# Patient Record
Sex: MALE | Race: ASIAN | NOT HISPANIC OR LATINO | URBAN - METROPOLITAN AREA
[De-identification: names, ages, dates, MRNs, and addresses within clinical notes are randomized per-mention and may not be internally consistent; named-entity substitution may affect disease eponyms.]

---
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
## 2022-10-08 PROBLEM — Z78.9 OTHER SPECIFIED HEALTH STATUS: Chronic | Status: ACTIVE | Noted: 2021-06-26

## 2022-10-21 PROBLEM — Z00.00 ENCOUNTER FOR PREVENTIVE HEALTH EXAMINATION: Status: ACTIVE | Noted: 2022-10-21

## 2022-10-24 ENCOUNTER — NON-APPOINTMENT (OUTPATIENT)
Age: 22
End: 2022-10-24

## 2022-10-24 ENCOUNTER — APPOINTMENT (OUTPATIENT)
Dept: ORTHOPEDIC SURGERY | Facility: CLINIC | Age: 22
End: 2022-10-24

## 2022-10-24 VITALS
HEIGHT: 71 IN | WEIGHT: 173 LBS | BODY MASS INDEX: 24.22 KG/M2 | DIASTOLIC BLOOD PRESSURE: 80 MMHG | SYSTOLIC BLOOD PRESSURE: 120 MMHG | HEART RATE: 80 BPM

## 2022-10-24 PROCEDURE — 99204 OFFICE O/P NEW MOD 45 MIN: CPT

## 2022-10-24 NOTE — PHYSICAL EXAM
[de-identified] : Left ankle Physical Examination:\par \par General: Alert and oriented x3.  In no acute distress.  Pleasant in nature with a normal affect.  No apparent respiratory distress. \par Erythema, Warmth, Rubor: Negative\par Swelling: Negative\par \par ROM:\par 1. Dorsiflexion: 10 degrees\par 2. Plantarflexion: 40 degrees\par 3. Inversion: 20 degrees\par 4. Eversion: 20 degrees\par \par Tenderness to Palpation: \par 1. Lateral Malleolus: Negative\par 2. Medial Malleolus: Negative\par 3. Proximal Fibular Pain: Negative\par 4. Heel Pain: Negative\par 5. Cuboid: Negative\par 6. Navicular: Negative\par 7. Tibiotalar Joint: Negative\par 8. Subtalar Joint: Negative\par 9. Posterior Recess: Negative\par \par Tendon Pain:\par 1. Achilles: Negative\par 2. Peroneals: Positive\par 3. Posterior Tibialis: Positive\par 4. Tibialis Anterior: Negative\par \par Ligament Pain:\par 1. ATFL: Positive\par 2. CFL: Negative \par 3. PTFL: Negative\par 4. Deltoid Ligaments: Negative\par 5. Lis Franc Ligament: Negative\par \par Stability: \par 1. Anterior Drawer: Negative\par 2. Posterior Drawer: Negative\par \par Strength: 5/5 TA/GS/EHL\par \par Pulses: 2+ DP/PT Pulses\par \par Neuro: Intact motor and sensory\par \par Additional Test:\par 1. Calcaneal Squeeze Test: Negative\par 2. Syndesmosis Squeeze Test: Negative [de-identified] : Exam requested by:\par CLOTILDE BARRON DPM\par [address]\par [facility] [address]\par SITE PERFORMED: [facility]\par Patient: ANUSHA GRAFF\par YOB: 2000\par Phone: [phone] \par MRN: [id number] Acc: [id number]\par Date of Exam: 09-\par  \par EXAM:  MRI LEFT ANKLE WITHOUT CONTRAST\par \par HISTORY:  Left ankle pain with concern for ligament tear\par \par TECHNIQUE:  Multiplanar imaging with and without fat suppression \par \par \par COMPARISON:  No comparison study\par \par FINDINGS:  \par \par No fracture tibia. No fracture fibula. No osteochondral defect talar dome.\par \par No evidence for coalition. Calcaneocuboid joint intact. Talonavicular joint intact with dorsal spurring at the talar insertion of the capsule.\par \par Plantar fascia intact at the origin. No bursitis. No fracture.\par \par Achilles intact at the insertion. Physiologic fluid in the bursa. 5 mm bone island base of superior calcaneal process\par \par Extensor tendons are intact. No tenosynovitis.\par \par Peroneal tendons are intact. Minor tendinopathy peroneus longus distal to the peroneal tubercle. No tenosynovitis or subluxation. Broad peroneal tubercle. Minimal reactive marrow edema within the tubercle and at the base of the tubercle.\par \par Posterior tibial tendinopathy with insertional fraying. No tenosynovitis. Peritendinous edema. Remaining flexor tendons intact. No lesion tarsal tunnel. No muscle atrophy or tear..\par \par Syndesmosis intact. Tibiofibular ligaments intact. Chronic tear anterior talofibular ligament and calcaneofibular ligament. Deltoid ligaments intact. Spring ligament intact. Tarsal sinus ligaments are scarred with scarring of the fat. No lesion tarsal sinus.\par \par IMPRESSION:  \par \par 1. Posterior tibial tendinopathy with insertional fraying and peritendinous edema.\par 2. Broad peroneal tubercle with minimal reactive marrow edema compatible with stress reaction. Peroneus longus tendinopathy just distal to the peroneal tubercle with no peroneal tendon tear.\par 3. Chronic tear of the lateral ligaments\par \par Thank you for the opportunity to participate in the care of this patient.  \par  \par Steven Ocampo MD  - Electronically Signed: 10- 7:44 AM \par Physician to Physician Direct Line is: [phone]\par Confidential\par Tel: [phone] - Fax: [phone] - [email or web address]\par Printed: 10- 1:17 PM\par ANUSHA GRAFF (Exam: 09- 2:10 PM)\par Page 1 of 1\par

## 2022-10-24 NOTE — HISTORY OF PRESENT ILLNESS
[FreeTextEntry1] : The patient is a 22-year-old male who presents with a left ankle injury which she sustained at the end of April while playing basketball.  He came down on someone's foot and rolled the left ankle.  He does have an MRI of the left ankle to be reviewed in office today.  He has not done any type of outpatient therapy for his left ankle.  He denies locking and catching of the ankle.  He does have a brace given to him by a podiatrist which she does use.  No other complaints.  Pain scale minimal today in the office. No other complaints.

## 2022-10-24 NOTE — DISCUSSION/SUMMARY
[de-identified] : Assessment: Left ankle injury\par \par Plan:\par 1. Physical Therapy.\par 2. NSAIDs/Tylenol as needed for pain. \par 3. Return to normal activities as tolerated. \par 4. Continue with ice and heat therapy. \par 5. All questions answered.  Follow-up as needed.  The patient understood the treatment plan.

## 2022-10-24 NOTE — ADDENDUM
[FreeTextEntry1] : I, Beverly Aguilar, acted as a scribe for Dr. Popeye Smallwood on this date 10/24/2022.\par \par All medical record entries made by the Scribe were at my, Dr. Popeye Smallwood, direction and personally dictated by me on 10/24/2022. I have reviewed the chart and agree that the record accurately reflects my personal performance of the history, physical exam, assessment and plan. I have also personally directed, reviewed, and agreed with the chart.	\par

## 2023-01-18 ENCOUNTER — APPOINTMENT (OUTPATIENT)
Dept: ORTHOPEDIC SURGERY | Facility: CLINIC | Age: 23
End: 2023-01-18
Payer: MEDICAID

## 2023-01-18 PROCEDURE — 99214 OFFICE O/P EST MOD 30 MIN: CPT

## 2023-01-18 NOTE — DISCUSSION/SUMMARY
[de-identified] : The patient will continue with conservative management and observation for his left ankle.  He will continue with home exercises and stretches learned at physical therapy and a new physical therapy prescription was provided..  He can slowly get back to normal activities and sports as tolerated.  I explained to him that proper shoe wear with proper inserts is extremely important.  He can use an over-the-counter ankle brace versus sleeve for support when playing sports.  Anti-inflammatory gel prescribed.  All of his questions were answered.  He can follow-up on an as-needed basis.

## 2023-01-18 NOTE — HISTORY OF PRESENT ILLNESS
[FreeTextEntry1] : 1/18/23: The patient is here for follow-up of his left ankle injury.  He has been performing physical therapy which has helped him.  He just finished physical therapy.  His pain is much improved left ankle.  He does have pain he states is mostly on the outside portion of his ankle.  No other complaints.\par \par 10/24/22: The patient is a 22-year-old male who presents with a left ankle injury which she sustained at the end of April while playing basketball.  He came down on someone's foot and rolled the left ankle.  He does have an MRI of the left ankle to be reviewed in office today.  He has not done any type of outpatient therapy for his left ankle.  He denies locking and catching of the ankle.  He does have a brace given to him by a podiatrist which she does use.  No other complaints.  Pain scale minimal today in the office. No other complaints.

## 2023-01-18 NOTE — PHYSICAL EXAM
[de-identified] : Left ankle Physical Examination:\par \par General: Alert and oriented x3.  In no acute distress.  Pleasant in nature with a normal affect.  No apparent respiratory distress. \par Erythema, Warmth, Rubor: Negative\par Swelling: Negative\par \par ROM:\par 1. Dorsiflexion: 10 degrees\par 2. Plantarflexion: 40 degrees\par 3. Inversion: 30 degrees\par 4. Eversion: 30 degrees\par \par Tenderness to Palpation: \par 1. Lateral Malleolus: Negative\par 2. Medial Malleolus: Negative\par 3. Proximal Fibular Pain: Negative\par 4. Heel Pain: Negative\par 5. Cuboid: Negative\par 6. Navicular: Negative\par 7. Tibiotalar Joint: Negative\par 8. Subtalar Joint: Negative\par 9. Posterior Recess: Negative\par \par Tendon Pain:\par 1. Achilles: Negative\par 2. Peroneals: Positive, improved.\par 3. Posterior Tibialis: Negative\par 4. Tibialis Anterior: Negative\par \par Ligament Pain:\par 1. ATFL: Positive, improved.\par 2. CFL: Negative \par 3. PTFL: Negative\par 4. Deltoid Ligaments: Negative\par 5. Lis Franc Ligament: Negative\par \par Stability: \par 1. Anterior Drawer: Negative\par 2. Posterior Drawer: Negative\par \par Strength: 5/5 TA/GS/EHL\par \par Pulses: 2+ DP/PT Pulses\par \par Neuro: Intact motor and sensory\par \par Additional Test:\par 1. Calcaneal Squeeze Test: Negative\par 2. Syndesmosis Squeeze Test: Negative [de-identified] : Exam requested by:\par CLOTILDE BARRON DPM\par [address]\par [facility] [address]\par SITE PERFORMED: [facility]\par Patient: ANUSHA GRAFF\par YOB: 2000\par Phone: [phone] \par MRN: [id number] Acc: [id number]\par Date of Exam: 09-\par  \par EXAM:  MRI LEFT ANKLE WITHOUT CONTRAST\par \par HISTORY:  Left ankle pain with concern for ligament tear\par \par TECHNIQUE:  Multiplanar imaging with and without fat suppression \par \par \par COMPARISON:  No comparison study\par \par FINDINGS:  \par \par No fracture tibia. No fracture fibula. No osteochondral defect talar dome.\par \par No evidence for coalition. Calcaneocuboid joint intact. Talonavicular joint intact with dorsal spurring at the talar insertion of the capsule.\par \par Plantar fascia intact at the origin. No bursitis. No fracture.\par \par Achilles intact at the insertion. Physiologic fluid in the bursa. 5 mm bone island base of superior calcaneal process\par \par Extensor tendons are intact. No tenosynovitis.\par \par Peroneal tendons are intact. Minor tendinopathy peroneus longus distal to the peroneal tubercle. No tenosynovitis or subluxation. Broad peroneal tubercle. Minimal reactive marrow edema within the tubercle and at the base of the tubercle.\par \par Posterior tibial tendinopathy with insertional fraying. No tenosynovitis. Peritendinous edema. Remaining flexor tendons intact. No lesion tarsal tunnel. No muscle atrophy or tear..\par \par Syndesmosis intact. Tibiofibular ligaments intact. Chronic tear anterior talofibular ligament and calcaneofibular ligament. Deltoid ligaments intact. Spring ligament intact. Tarsal sinus ligaments are scarred with scarring of the fat. No lesion tarsal sinus.\par \par IMPRESSION:  \par \par 1. Posterior tibial tendinopathy with insertional fraying and peritendinous edema.\par 2. Broad peroneal tubercle with minimal reactive marrow edema compatible with stress reaction. Peroneus longus tendinopathy just distal to the peroneal tubercle with no peroneal tendon tear.\par 3. Chronic tear of the lateral ligaments\par \par Thank you for the opportunity to participate in the care of this patient.  \par  \par Steven Ocampo MD  - Electronically Signed: 10- 7:44 AM \par Physician to Physician Direct Line is: [phone]\par Confidential\par Tel: [phone] - Fax: [phone] - [email or web address]\par Printed: 10- 1:17 PM\par ANUSHA GRAFF (Exam: 09- 2:10 PM)\par Page 1 of 1\par

## 2023-02-22 ENCOUNTER — RX RENEWAL (OUTPATIENT)
Age: 23
End: 2023-02-22

## 2023-02-22 RX ORDER — MELOXICAM 15 MG/1
15 TABLET ORAL
Qty: 14 | Refills: 0 | Status: ACTIVE | COMMUNITY
Start: 2023-01-18 | End: 1900-01-01

## 2023-04-12 ENCOUNTER — APPOINTMENT (OUTPATIENT)
Dept: ORTHOPEDIC SURGERY | Facility: CLINIC | Age: 23
End: 2023-04-12
Payer: MEDICAID

## 2023-04-12 PROCEDURE — 99213 OFFICE O/P EST LOW 20 MIN: CPT

## 2023-04-12 NOTE — PHYSICAL EXAM
[de-identified] : Left ankle Physical Examination:\par \par General: Alert and oriented x3.  In no acute distress.  Pleasant in nature with a normal affect.  No apparent respiratory distress. \par Erythema, Warmth, Rubor: Negative\par Swelling: Negative\par \par ROM:\par 1. Dorsiflexion: 10 degrees\par 2. Plantarflexion: 40 degrees\par 3. Inversion: 30 degrees\par 4. Eversion: 30 degrees\par \par Tenderness to Palpation: \par 1. Lateral Malleolus: Negative\par 2. Medial Malleolus: Negative\par 3. Proximal Fibular Pain: Negative\par 4. Heel Pain: Negative\par 5. Cuboid: Negative\par 6. Navicular: Negative\par 7. Tibiotalar Joint: Negative\par 8. Subtalar Joint: Negative\par 9. Posterior Recess: Negative\par \par Tendon Pain:\par 1. Achilles: Negative\par 2. Peroneals: Positive, improved.\par 3. Posterior Tibialis: Negative\par 4. Tibialis Anterior: Negative\par \par Ligament Pain:\par 1. ATFL: Positive, improved.\par 2. CFL: Negative \par 3. PTFL: Negative\par 4. Deltoid Ligaments: Negative\par 5. Lis Franc Ligament: Negative\par \par Stability: \par 1. Anterior Drawer: Negative\par 2. Posterior Drawer: Negative\par \par Strength: 5/5 TA/GS/EHL\par \par Pulses: 2+ DP/PT Pulses\par \par Neuro: Intact motor and sensory\par \par Additional Test:\par 1. Calcaneal Squeeze Test: Negative\par 2. Syndesmosis Squeeze Test: Negative [de-identified] : Exam requested by:\par CLOTILDE BARRON DPM\par [address]\par [facility] [address]\par SITE PERFORMED: [facility]\par Patient: ANUSHA GRAFF\par YOB: 2000\par Phone: [phone] \par MRN: [id number] Acc: [id number]\par Date of Exam: 09-\par  \par EXAM:  MRI LEFT ANKLE WITHOUT CONTRAST\par \par HISTORY:  Left ankle pain with concern for ligament tear\par \par TECHNIQUE:  Multiplanar imaging with and without fat suppression \par \par \par COMPARISON:  No comparison study\par \par FINDINGS:  \par \par No fracture tibia. No fracture fibula. No osteochondral defect talar dome.\par \par No evidence for coalition. Calcaneocuboid joint intact. Talonavicular joint intact with dorsal spurring at the talar insertion of the capsule.\par \par Plantar fascia intact at the origin. No bursitis. No fracture.\par \par Achilles intact at the insertion. Physiologic fluid in the bursa. 5 mm bone island base of superior calcaneal process\par \par Extensor tendons are intact. No tenosynovitis.\par \par Peroneal tendons are intact. Minor tendinopathy peroneus longus distal to the peroneal tubercle. No tenosynovitis or subluxation. Broad peroneal tubercle. Minimal reactive marrow edema within the tubercle and at the base of the tubercle.\par \par Posterior tibial tendinopathy with insertional fraying. No tenosynovitis. Peritendinous edema. Remaining flexor tendons intact. No lesion tarsal tunnel. No muscle atrophy or tear..\par \par Syndesmosis intact. Tibiofibular ligaments intact. Chronic tear anterior talofibular ligament and calcaneofibular ligament. Deltoid ligaments intact. Spring ligament intact. Tarsal sinus ligaments are scarred with scarring of the fat. No lesion tarsal sinus.\par \par IMPRESSION:  \par \par 1. Posterior tibial tendinopathy with insertional fraying and peritendinous edema.\par 2. Broad peroneal tubercle with minimal reactive marrow edema compatible with stress reaction. Peroneus longus tendinopathy just distal to the peroneal tubercle with no peroneal tendon tear.\par 3. Chronic tear of the lateral ligaments\par \par Thank you for the opportunity to participate in the care of this patient.  \par  \par Steven Ocampo MD  - Electronically Signed: 10- 7:44 AM \par Physician to Physician Direct Line is: [phone]\par Confidential\par Tel: [phone] - Fax: [phone] - [email or web address]\par Printed: 10- 1:17 PM\par ANUSHA GRAFF (Exam: 09- 2:10 PM)\par Page 1 of 1\par

## 2023-04-12 NOTE — ADDENDUM
[FreeTextEntry1] : I, COLLINS RAINERAPOLONIAKARINE, acted solely as a scribe for Dr. Popeye Smallwood on this date 04/12/2023  .\par  \par All medical record entries made by the Scribe were at my, Dr. Popeye Smallwood, direction and personally dictated by me on 04/12/2023 . I have reviewed the chart and agree that the record accurately reflects my personal performance of the history, physical exam, assessment and plan. I have also personally directed, reviewed, and agreed with the chart.

## 2023-04-12 NOTE — DISCUSSION/SUMMARY
[de-identified] : Today I had a lengthy discussion with the patient regarding their left ankle pain.I have addressed all the patient's concerns surrounding the pathology of their condition. We discussed operative and non operative treatment, At this time I would like to obtain advanced imaging of the patient's left ankle. An MRI was ordered so I can find out more about the etiology of the patient's condition. The patient should follow up with the office after obtaining the MRI. \par The patient will continue with conservative management and observation for his left ankle.  He will continue with home exercises and stretches learned at physical therapy and a new physical therapy prescription was provided. All of his questions were answered.\par \par The patient understood and verbally agreed to the treatment plan. All of their questions were answered and they were satisfied with the visit. The patient should call the office if they have any questions or experience worsening symptoms. \par

## 2023-04-12 NOTE — HISTORY OF PRESENT ILLNESS
[FreeTextEntry1] : 4/12/23: The patient is here for follow-up of his left ankle. He finished physical therapy and had relief, but he is still having some intermittent pain. He would like an MRI to see how his ankle is progressing and what next steps he can take. \par \par 1/18/23: The patient is here for follow-up of his left ankle injury.  He has been performing physical therapy which has helped him.  He just finished physical therapy.  His pain is much improved left ankle.  He does have pain he states is mostly on the outside portion of his ankle.  No other complaints.\par \par 10/24/22: The patient is a 22-year-old male who presents with a left ankle injury which she sustained at the end of April while playing basketball.  He came down on someone's foot and rolled the left ankle.  He does have an MRI of the left ankle to be reviewed in office today.  He has not done any type of outpatient therapy for his left ankle.  He denies locking and catching of the ankle.  He does have a brace given to him by a podiatrist which she does use.  No other complaints.  Pain scale minimal today in the office. No other complaints.

## 2023-04-12 NOTE — DISCUSSION/SUMMARY
[de-identified] : Today I had a lengthy discussion with the patient regarding their left ankle pain.I have addressed all the patient's concerns surrounding the pathology of their condition. We discussed operative and non operative treatment, At this time I would like to obtain advanced imaging of the patient's left ankle. An MRI was ordered so I can find out more about the etiology of the patient's condition. The patient should follow up with the office after obtaining the MRI. \par The patient will continue with conservative management and observation for his left ankle.  He will continue with home exercises and stretches learned at physical therapy and a new physical therapy prescription was provided. All of his questions were answered.\par \par The patient understood and verbally agreed to the treatment plan. All of their questions were answered and they were satisfied with the visit. The patient should call the office if they have any questions or experience worsening symptoms. \par

## 2023-04-12 NOTE — PHYSICAL EXAM
[de-identified] : Left ankle Physical Examination:\par \par General: Alert and oriented x3.  In no acute distress.  Pleasant in nature with a normal affect.  No apparent respiratory distress. \par Erythema, Warmth, Rubor: Negative\par Swelling: Negative\par \par ROM:\par 1. Dorsiflexion: 10 degrees\par 2. Plantarflexion: 40 degrees\par 3. Inversion: 30 degrees\par 4. Eversion: 30 degrees\par \par Tenderness to Palpation: \par 1. Lateral Malleolus: Negative\par 2. Medial Malleolus: Negative\par 3. Proximal Fibular Pain: Negative\par 4. Heel Pain: Negative\par 5. Cuboid: Negative\par 6. Navicular: Negative\par 7. Tibiotalar Joint: Negative\par 8. Subtalar Joint: Negative\par 9. Posterior Recess: Negative\par \par Tendon Pain:\par 1. Achilles: Negative\par 2. Peroneals: Positive, improved.\par 3. Posterior Tibialis: Negative\par 4. Tibialis Anterior: Negative\par \par Ligament Pain:\par 1. ATFL: Positive, improved.\par 2. CFL: Negative \par 3. PTFL: Negative\par 4. Deltoid Ligaments: Negative\par 5. Lis Franc Ligament: Negative\par \par Stability: \par 1. Anterior Drawer: Negative\par 2. Posterior Drawer: Negative\par \par Strength: 5/5 TA/GS/EHL\par \par Pulses: 2+ DP/PT Pulses\par \par Neuro: Intact motor and sensory\par \par Additional Test:\par 1. Calcaneal Squeeze Test: Negative\par 2. Syndesmosis Squeeze Test: Negative [de-identified] : Exam requested by:\par CLOTILDE BARRON DPM\par [address]\par [facility] [address]\par SITE PERFORMED: [facility]\par Patient: ANUSHA GRAFF\par YOB: 2000\par Phone: [phone] \par MRN: [id number] Acc: [id number]\par Date of Exam: 09-\par  \par EXAM:  MRI LEFT ANKLE WITHOUT CONTRAST\par \par HISTORY:  Left ankle pain with concern for ligament tear\par \par TECHNIQUE:  Multiplanar imaging with and without fat suppression \par \par \par COMPARISON:  No comparison study\par \par FINDINGS:  \par \par No fracture tibia. No fracture fibula. No osteochondral defect talar dome.\par \par No evidence for coalition. Calcaneocuboid joint intact. Talonavicular joint intact with dorsal spurring at the talar insertion of the capsule.\par \par Plantar fascia intact at the origin. No bursitis. No fracture.\par \par Achilles intact at the insertion. Physiologic fluid in the bursa. 5 mm bone island base of superior calcaneal process\par \par Extensor tendons are intact. No tenosynovitis.\par \par Peroneal tendons are intact. Minor tendinopathy peroneus longus distal to the peroneal tubercle. No tenosynovitis or subluxation. Broad peroneal tubercle. Minimal reactive marrow edema within the tubercle and at the base of the tubercle.\par \par Posterior tibial tendinopathy with insertional fraying. No tenosynovitis. Peritendinous edema. Remaining flexor tendons intact. No lesion tarsal tunnel. No muscle atrophy or tear..\par \par Syndesmosis intact. Tibiofibular ligaments intact. Chronic tear anterior talofibular ligament and calcaneofibular ligament. Deltoid ligaments intact. Spring ligament intact. Tarsal sinus ligaments are scarred with scarring of the fat. No lesion tarsal sinus.\par \par IMPRESSION:  \par \par 1. Posterior tibial tendinopathy with insertional fraying and peritendinous edema.\par 2. Broad peroneal tubercle with minimal reactive marrow edema compatible with stress reaction. Peroneus longus tendinopathy just distal to the peroneal tubercle with no peroneal tendon tear.\par 3. Chronic tear of the lateral ligaments\par \par Thank you for the opportunity to participate in the care of this patient.  \par  \par Steven Ocampo MD  - Electronically Signed: 10- 7:44 AM \par Physician to Physician Direct Line is: [phone]\par Confidential\par Tel: [phone] - Fax: [phone] - [email or web address]\par Printed: 10- 1:17 PM\par ANUSHA GRAFF (Exam: 09- 2:10 PM)\par Page 1 of 1\par

## 2023-04-21 ENCOUNTER — TRANSCRIPTION ENCOUNTER (OUTPATIENT)
Age: 23
End: 2023-04-21

## 2023-04-21 ENCOUNTER — APPOINTMENT (OUTPATIENT)
Dept: MRI IMAGING | Facility: CLINIC | Age: 23
End: 2023-04-21
Payer: MEDICAID

## 2023-04-21 ENCOUNTER — OUTPATIENT (OUTPATIENT)
Dept: OUTPATIENT SERVICES | Facility: HOSPITAL | Age: 23
LOS: 1 days | End: 2023-04-21
Payer: MEDICAID

## 2023-04-21 DIAGNOSIS — M67.88 OTHER SPECIFIED DISORDERS OF SYNOVIUM AND TENDON, OTHER SITE: ICD-10-CM

## 2023-04-21 DIAGNOSIS — S93.402A SPRAIN OF UNSPECIFIED LIGAMENT OF LEFT ANKLE, INITIAL ENCOUNTER: ICD-10-CM

## 2023-04-21 DIAGNOSIS — M76.822 POSTERIOR TIBIAL TENDINITIS, LEFT LEG: ICD-10-CM

## 2023-04-21 PROCEDURE — 73721 MRI JNT OF LWR EXTRE W/O DYE: CPT

## 2023-04-21 PROCEDURE — 73721 MRI JNT OF LWR EXTRE W/O DYE: CPT | Mod: 26,LT

## 2023-05-15 ENCOUNTER — APPOINTMENT (OUTPATIENT)
Dept: ORTHOPEDIC SURGERY | Facility: CLINIC | Age: 23
End: 2023-05-15
Payer: MEDICAID

## 2023-05-15 PROCEDURE — 99214 OFFICE O/P EST MOD 30 MIN: CPT

## 2023-05-15 NOTE — PHYSICAL EXAM
[de-identified] : Left ankle Physical Examination:\par \par General: Alert and oriented x3.  In no acute distress.  Pleasant in nature with a normal affect.  No apparent respiratory distress. \par Erythema, Warmth, Rubor: Negative\par Swelling: Negative\par \par ROM:\par 1. Dorsiflexion: 10 degrees\par 2. Plantarflexion: 40 degrees\par 3. Inversion: 30 degrees\par 4. Eversion: 30 degrees\par \par Tenderness to Palpation: \par 1. Lateral Malleolus: Negative\par 2. Medial Malleolus: Negative\par 3. Proximal Fibular Pain: Negative\par 4. Heel Pain: Negative\par 5. Cuboid: Negative\par 6. Navicular: Negative\par 7. Tibiotalar Joint: Negative\par 8. Subtalar Joint: Negative\par 9. Posterior Recess: Negative\par \par Tendon Pain:\par 1. Achilles: Negative\par 2. Peroneals: Positive, improved.\par 3. Posterior Tibialis: Negative\par 4. Tibialis Anterior: Negative\par \par Ligament Pain:\par 1. ATFL: Positive, improved.\par 2. CFL: Negative \par 3. PTFL: Negative\par 4. Deltoid Ligaments: Negative\par 5. Lis Franc Ligament: Negative\par \par Stability: \par 1. Anterior Drawer: Negative\par 2. Posterior Drawer: Negative\par \par Strength: 5/5 TA/GS/EHL\par \par Pulses: 2+ DP/PT Pulses\par \par Neuro: Intact motor and sensory\par \par Additional Test:\par 1. Calcaneal Squeeze Test: Negative\par 2. Syndesmosis Squeeze Test: Negative [de-identified] : Exam requested by:\par CLOTILDE BARRON DPM\par [address]\par [facility] [id number]\par SITE PERFORMED: [facility]\par Patient: ANUSHA GRAFF\par YOB: 2000\par Phone: [phone] \par MRN: [id number] Acc: [id number]\par Date of Exam: 09-\par  \par EXAM:  MRI LEFT ANKLE WITHOUT CONTRAST\par \par HISTORY:  Left ankle pain with concern for ligament tear\par \par TECHNIQUE:  Multiplanar imaging with and without fat suppression \par \par \par COMPARISON:  No comparison study\par \par FINDINGS:  \par \par No fracture tibia. No fracture fibula. No osteochondral defect talar dome.\par \par No evidence for coalition. Calcaneocuboid joint intact. Talonavicular joint intact with dorsal spurring at the talar insertion of the capsule.\par \par Plantar fascia intact at the origin. No bursitis. No fracture.\par \par Achilles intact at the insertion. Physiologic fluid in the bursa. 5 mm bone island base of superior calcaneal process\par \par Extensor tendons are intact. No tenosynovitis.\par \par Peroneal tendons are intact. Minor tendinopathy peroneus longus distal to the peroneal tubercle. No tenosynovitis or subluxation. Broad peroneal tubercle. Minimal reactive marrow edema within the tubercle and at the base of the tubercle.\par \par Posterior tibial tendinopathy with insertional fraying. No tenosynovitis. Peritendinous edema. Remaining flexor tendons intact. No lesion tarsal tunnel. No muscle atrophy or tear..\par \par Syndesmosis intact. Tibiofibular ligaments intact. Chronic tear anterior talofibular ligament and calcaneofibular ligament. Deltoid ligaments intact. Spring ligament intact. Tarsal sinus ligaments are scarred with scarring of the fat. No lesion tarsal sinus.\par \par IMPRESSION:  \par \par 1. Posterior tibial tendinopathy with insertional fraying and peritendinous edema.\par 2. Broad peroneal tubercle with minimal reactive marrow edema compatible with stress reaction. Peroneus longus tendinopathy just distal to the peroneal tubercle with no peroneal tendon tear.\par 3. Chronic tear of the lateral ligaments\par \par Thank you for the opportunity to participate in the care of this patient.  \par  \par Steven Ocampo MD  - Electronically Signed: 10- 7:44 AM \par Physician to Physician Direct Line is: [phone]\par Confidential\par Tel: [phone] - Fax: [phone] - [email or web address]\par Printed: 10- 1:17 PM\par ANUSHA GRAFF (Exam: 09- 2:10 PM)\par Page 1 of 1\par

## 2023-05-15 NOTE — DISCUSSION/SUMMARY
[de-identified] : Today I had a lengthy discussion with the patient regarding their left ankle pain.I have addressed all the patient's concerns surrounding the pathology of their condition. We discussed operative and non operative treatment, At this time I would like to obtain advanced imaging of the patient's left ankle. An MRI was ordered so I can find out more about the etiology of the patient's condition. The patient should follow up with the office after obtaining the MRI. \par The patient will continue with conservative management and observation for his left ankle.  He will continue with home exercises and stretches learned at physical therapy and a new physical therapy prescription was provided. All of his questions were answered.\par \par The patient understood and verbally agreed to the treatment plan. All of their questions were answered and they were satisfied with the visit. The patient should call the office if they have any questions or experience worsening symptoms. \par

## 2023-05-15 NOTE — DISCUSSION/SUMMARY
[de-identified] : Today I had a lengthy discussion with the patient regarding their left ankle pain.I have addressed all the patient's concerns surrounding the pathology of their condition. We discussed operative and non operative treatment, At this time I would like to obtain advanced imaging of the patient's left ankle. An MRI was ordered so I can find out more about the etiology of the patient's condition. The patient should follow up with the office after obtaining the MRI. \par The patient will continue with conservative management and observation for his left ankle.  He will continue with home exercises and stretches learned at physical therapy and a new physical therapy prescription was provided. All of his questions were answered.\par \par The patient understood and verbally agreed to the treatment plan. All of their questions were answered and they were satisfied with the visit. The patient should call the office if they have any questions or experience worsening symptoms. \par

## 2023-05-15 NOTE — PHYSICAL EXAM
[de-identified] : Left ankle Physical Examination:\par \par General: Alert and oriented x3.  In no acute distress.  Pleasant in nature with a normal affect.  No apparent respiratory distress. \par Erythema, Warmth, Rubor: Negative\par Swelling: Negative\par \par ROM:\par 1. Dorsiflexion: 10 degrees\par 2. Plantarflexion: 40 degrees\par 3. Inversion: 30 degrees\par 4. Eversion: 30 degrees\par \par Tenderness to Palpation: \par 1. Lateral Malleolus: Negative\par 2. Medial Malleolus: Negative\par 3. Proximal Fibular Pain: Negative\par 4. Heel Pain: Negative\par 5. Cuboid: Negative\par 6. Navicular: Negative\par 7. Tibiotalar Joint: Negative\par 8. Subtalar Joint: Negative\par 9. Posterior Recess: Negative\par \par Tendon Pain:\par 1. Achilles: Negative\par 2. Peroneals: Positive, improved.\par 3. Posterior Tibialis: Negative\par 4. Tibialis Anterior: Negative\par \par Ligament Pain:\par 1. ATFL: Positive, improved.\par 2. CFL: Negative \par 3. PTFL: Negative\par 4. Deltoid Ligaments: Negative\par 5. Lis Franc Ligament: Negative\par \par Stability: \par 1. Anterior Drawer: Negative\par 2. Posterior Drawer: Negative\par \par Strength: 5/5 TA/GS/EHL\par \par Pulses: 2+ DP/PT Pulses\par \par Neuro: Intact motor and sensory\par \par Additional Test:\par 1. Calcaneal Squeeze Test: Negative\par 2. Syndesmosis Squeeze Test: Negative [de-identified] : Exam requested by:\par CLOTILDE BARRON DPM\par [address]\par [facility] [id number]\par SITE PERFORMED: [facility]\par Patient: ANUSHA GRFAF\par YOB: 2000\par Phone: [phone] \par MRN: [id number] Acc: [id number]\par Date of Exam: 09-\par  \par EXAM:  MRI LEFT ANKLE WITHOUT CONTRAST\par \par HISTORY:  Left ankle pain with concern for ligament tear\par \par TECHNIQUE:  Multiplanar imaging with and without fat suppression \par \par \par COMPARISON:  No comparison study\par \par FINDINGS:  \par \par No fracture tibia. No fracture fibula. No osteochondral defect talar dome.\par \par No evidence for coalition. Calcaneocuboid joint intact. Talonavicular joint intact with dorsal spurring at the talar insertion of the capsule.\par \par Plantar fascia intact at the origin. No bursitis. No fracture.\par \par Achilles intact at the insertion. Physiologic fluid in the bursa. 5 mm bone island base of superior calcaneal process\par \par Extensor tendons are intact. No tenosynovitis.\par \par Peroneal tendons are intact. Minor tendinopathy peroneus longus distal to the peroneal tubercle. No tenosynovitis or subluxation. Broad peroneal tubercle. Minimal reactive marrow edema within the tubercle and at the base of the tubercle.\par \par Posterior tibial tendinopathy with insertional fraying. No tenosynovitis. Peritendinous edema. Remaining flexor tendons intact. No lesion tarsal tunnel. No muscle atrophy or tear..\par \par Syndesmosis intact. Tibiofibular ligaments intact. Chronic tear anterior talofibular ligament and calcaneofibular ligament. Deltoid ligaments intact. Spring ligament intact. Tarsal sinus ligaments are scarred with scarring of the fat. No lesion tarsal sinus.\par \par IMPRESSION:  \par \par 1. Posterior tibial tendinopathy with insertional fraying and peritendinous edema.\par 2. Broad peroneal tubercle with minimal reactive marrow edema compatible with stress reaction. Peroneus longus tendinopathy just distal to the peroneal tubercle with no peroneal tendon tear.\par 3. Chronic tear of the lateral ligaments\par \par Thank you for the opportunity to participate in the care of this patient.  \par  \par Steven Ocampo MD  - Electronically Signed: 10- 7:44 AM \par Physician to Physician Direct Line is: [phone]\par Confidential\par Tel: [phone] - Fax: [phone] - [email or web address]\par Printed: 10- 1:17 PM\par ANUSHA GRAFF (Exam: 09- 2:10 PM)\par Page 1 of 1\par

## 2023-05-15 NOTE — HISTORY OF PRESENT ILLNESS
[FreeTextEntry1] : 05/15/23: ANUSHA GRAFF is a 22 year old male who presents for follow up evaluation of his left ankle.\par \par 4/12/23: The patient is here for follow-up of his left ankle. He finished physical therapy and had relief, but he is still having some intermittent pain. He would like an MRI to see how his ankle is progressing and what next steps he can take. \par \par 1/18/23: The patient is here for follow-up of his left ankle injury.  He has been performing physical therapy which has helped him.  He just finished physical therapy.  His pain is much improved left ankle.  He does have pain he states is mostly on the outside portion of his ankle.  No other complaints.\par \par 10/24/22: The patient is a 22-year-old male who presents with a left ankle injury which she sustained at the end of April while playing basketball.  He came down on someone's foot and rolled the left ankle.  He does have an MRI of the left ankle to be reviewed in office today.  He has not done any type of outpatient therapy for his left ankle.  He denies locking and catching of the ankle.  He does have a brace given to him by a podiatrist which she does use.  No other complaints.  Pain scale minimal today in the office. No other complaints.

## 2023-05-15 NOTE — ADDENDUM
[FreeTextEntry1] : I, WAGNER PIMENTEL, acted solely as a scribe for Dr. Popeye Smallwood on this date 05/15/2023  .\par  \par All medical record entries made by the Scribe were at my, Dr. Popeye Smallwood, direction and personally dictated by me on 05/15/2023 . I have reviewed the chart and agree that the record accurately reflects my personal performance of the history, physical exam, assessment and plan. I have also personally directed, reviewed, and agreed with the chart.

## 2023-07-24 ENCOUNTER — APPOINTMENT (OUTPATIENT)
Dept: ORTHOPEDIC SURGERY | Facility: CLINIC | Age: 23
End: 2023-07-24
Payer: MEDICAID

## 2023-07-24 DIAGNOSIS — M67.88 OTHER SPECIFIED DISORDERS OF SYNOVIUM AND TENDON, OTHER SITE: ICD-10-CM

## 2023-07-24 DIAGNOSIS — S93.402A SPRAIN OF UNSPECIFIED LIGAMENT OF LEFT ANKLE, INITIAL ENCOUNTER: ICD-10-CM

## 2023-07-24 DIAGNOSIS — S93.401A SPRAIN OF UNSPECIFIED LIGAMENT OF RIGHT ANKLE, INITIAL ENCOUNTER: ICD-10-CM

## 2023-07-24 DIAGNOSIS — M76.822 POSTERIOR TIBIAL TENDINITIS, LEFT LEG: ICD-10-CM

## 2023-07-24 PROCEDURE — 99214 OFFICE O/P EST MOD 30 MIN: CPT

## 2023-07-24 PROCEDURE — 73610 X-RAY EXAM OF ANKLE: CPT | Mod: 50

## 2023-07-24 NOTE — HISTORY OF PRESENT ILLNESS
[FreeTextEntry1] : 7/24/23: ANUSHA GRAFF is a 22 year old male who presents for follow up evaluation of his left ankle. He states that he recently has not gone to physical therapy. He states that his pain in his left ankle has remained the same with no improvement. He does complain of right ankle pain that started 2 weeks ago after playing basketball. His pain is worse on his right ankle than his left. HIs right ankle pain is localized to the lateral and medial aspect of the ankle. He is wearing sneakers and is ambulating without assistance\par \par 05/15/23: ANUSHA GRAFF is a 22 year old male who presents for follow up evaluation of his left ankle. He presents today for MRI review. He presents today wearing sneakers and is ambulating without assistance. He notes the he had a previous Cortizone injection administered by his DPM.\par \par 4/12/23: The patient is here for follow-up of his left ankle. He finished physical therapy and had relief, but he is still having some intermittent pain. He would like an MRI to see how his ankle is progressing and what next steps he can take. \par \par 1/18/23: The patient is here for follow-up of his left ankle injury.  He has been performing physical therapy which has helped him.  He just finished physical therapy.  His pain is much improved left ankle.  He does have pain he states is mostly on the outside portion of his ankle.  No other complaints.\par \par 10/24/22: The patient is a 22-year-old male who presents with a left ankle injury which she sustained at the end of April while playing basketball.  He came down on someone's foot and rolled the left ankle.  He does have an MRI of the left ankle to be reviewed in office today.  He has not done any type of outpatient therapy for his left ankle.  He denies locking and catching of the ankle.  He does have a brace given to him by a podiatrist which she does use.  No other complaints.  Pain scale minimal today in the office. No other complaints.

## 2023-07-24 NOTE — DISCUSSION/SUMMARY
[de-identified] : Today I had a lengthy discussion with the patient regarding their bilateral ankle pain. I have addressed all the patient's concerns surrounding the pathology of their condition. I recommend the patient undergo a course of physical therapy for the Bilateral ankles 2-3 times a week for a total of 6-8 weeks. A prescription was given for the physical therapy today. I recommend that the patient utilize ice, NSAIDS PRN, and heat. They can also elevate their Left ankle above the level of the heart. A referral for acupuncture was provided for the patient today in office. \par \par A discussion was had about a possible Cortizone injection at the next office visit. \par \par I recommend the patient follow up in 6-8 weeks to reassess their condition. \par \par The patient understood and verbally agreed to the treatment plan. All of their questions were answered and they were satisfied with the visit. The patient should call the office if they have any questions or experience worsening symptoms. \par

## 2023-07-24 NOTE — PHYSICAL EXAM
[de-identified] : Left ankle Physical Examination:\par \par General: Alert and oriented x3.  In no acute distress.  Pleasant in nature with a normal affect.  No apparent respiratory distress. \par Erythema, Warmth, Rubor: Negative\par Swelling: Negative\par \par ROM:\par 1. Dorsiflexion: 10 degrees\par 2. Plantarflexion: 40 degrees\par 3. Inversion: 30 degrees\par 4. Eversion: 30 degrees\par \par Tenderness to Palpation: \par 1. Lateral Malleolus: Negative\par 2. Medial Malleolus: Negative\par 3. Proximal Fibular Pain: Negative\par 4. Heel Pain: Negative\par 5. Cuboid: Negative\par 6. Navicular: Negative\par 7. Tibiotalar Joint: Negative\par 8. Subtalar Joint: Negative\par 9. Posterior Recess: Negative\par \par Tendon Pain:\par 1. Achilles: Negative\par 2. Peroneals: Positive, improved.\par 3. Posterior Tibialis: Negative\par 4. Tibialis Anterior: Negative\par \par Ligament Pain:\par 1. ATFL: Positive, improved.\par 2. CFL: Negative \par 3. PTFL: Negative\par 4. Deltoid Ligaments: Negative\par 5. Lis Franc Ligament: Negative\par \par Stability: \par 1. Anterior Drawer: Negative\par 2. Posterior Drawer: Negative\par \par Strength: 5/5 TA/GS/EHL\par \par Pulses: 2+ DP/PT Pulses\par \par Neuro: Intact motor and sensory\par \par Additional Test:\par 1. Calcaneal Squeeze Test: Negative\par 2. Syndesmosis Squeeze Test: Negative [de-identified] : 4V of the Bilateral ankle were ordered obtained and reviewed by me today, 07/24/2023 , revealed: No acute abnormalities

## 2023-07-24 NOTE — ADDENDUM
[FreeTextEntry1] : I, WAGNER PIMENTEL, acted solely as a scribe for Dr. Popeye Smallwood on this date 07/24/2023  .\par  \par All medical record entries made by the Scribe were at my, Dr. Popeye Smallwood, direction and personally dictated by me on 07/24/2023 . I have reviewed the chart and agree that the record accurately reflects my personal performance of the history, physical exam, assessment and plan. I have also personally directed, reviewed, and agreed with the chart.

## 2023-08-25 ENCOUNTER — EMERGENCY (EMERGENCY)
Facility: HOSPITAL | Age: 23
LOS: 0 days | Discharge: HOME | End: 2023-08-25
Attending: EMERGENCY MEDICINE
Payer: MEDICAID

## 2023-08-25 VITALS
TEMPERATURE: 98 F | OXYGEN SATURATION: 99 % | DIASTOLIC BLOOD PRESSURE: 60 MMHG | HEART RATE: 71 BPM | RESPIRATION RATE: 18 BRPM | SYSTOLIC BLOOD PRESSURE: 105 MMHG

## 2023-08-25 VITALS — WEIGHT: 173.94 LBS | HEIGHT: 71 IN
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
## 2023-08-25 DIAGNOSIS — M25.571 PAIN IN RIGHT ANKLE AND JOINTS OF RIGHT FOOT: ICD-10-CM

## 2023-08-25 DIAGNOSIS — M25.471 EFFUSION, RIGHT ANKLE: ICD-10-CM

## 2023-08-25 PROCEDURE — 99283 EMERGENCY DEPT VISIT LOW MDM: CPT | Mod: 25

## 2023-08-25 PROCEDURE — 73610 X-RAY EXAM OF ANKLE: CPT | Mod: 26,RT

## 2023-08-25 PROCEDURE — 99285 EMERGENCY DEPT VISIT HI MDM: CPT

## 2023-08-25 PROCEDURE — 73610 X-RAY EXAM OF ANKLE: CPT | Mod: RT

## 2023-08-25 RX ORDER — IBUPROFEN 200 MG
1 TABLET ORAL
Qty: 20 | Refills: 0
Start: 2023-08-25 | End: 2023-08-29

## 2023-08-25 NOTE — ED PROVIDER NOTE - MUSCULOSKELETAL MINIMAL EXAM
R. ankle has some swelling, lateral malleolus has tenderness. No pain at base of foot. Neurovascularly intact. FROM.

## 2023-08-25 NOTE — ED PROVIDER NOTE - OBJECTIVE STATEMENT
Patient is a 22-year-old gentleman with no past medical history who presents to the ED because of right ankle pain.  Patient was playing [employer] ball couple hours ago, and he landed on the ball and rolled his ankle.  No other injuries.  Patient has swelling and difficulty putting weight on the right ankle.  No history of surgeries to this ankle.  No numbness or tingling, has been ambulating using crutches.

## 2023-08-25 NOTE — ED PROVIDER NOTE - PROGRESS NOTE DETAILS
Xray discussed with ortho, may have fibular Carlson A fracture vs contusion. Ortho recommends Air Cast, and d/c with f/u. Patient already has orthopedist, and has crutches.

## 2023-08-25 NOTE — ED PROVIDER NOTE - PATIENT PORTAL LINK FT
You can access the FollowMyHealth Patient Portal offered by [facility] by registering at the following website: [email or web address]. By joining [facility]’s FollowMyHealth portal, you will also be able to view your health information using other applications (apps) compatible with our system.

## 2023-08-25 NOTE — ED ADULT TRIAGE NOTE - CHIEF COMPLAINT QUOTE
Pt came into the ED with crutches with c/o right sided ankle pain/injury. Pt states "I was playing pickleball a couple hours ago and fell on my ankle". Pt denies any head injury or LOC. Pt reports not being able to ambulate without using crutches he had at home. Pt denies any other complaints at this time.

## 2023-08-25 NOTE — ED ADULT NURSE NOTE - OBJECTIVE STATEMENT
Pt is a 23 y/o male c/c of R ankle pain and injury. pt reports he was playing pickleball and tripped over the ball. no LOC or injury to head. R ankle swelling noted. pt denies pain at rest but endorses severe pain with movement/difficulty bearing weight. crutches at bedside. pt denies taking any pain meds.

## 2023-09-08 ENCOUNTER — APPOINTMENT (OUTPATIENT)
Dept: ORTHOPEDIC SURGERY | Facility: CLINIC | Age: 23
End: 2023-09-08
Payer: MEDICAID

## 2023-09-08 DIAGNOSIS — M25.571 PAIN IN RIGHT ANKLE AND JOINTS OF RIGHT FOOT: ICD-10-CM

## 2023-09-08 DIAGNOSIS — S99.911A UNSPECIFIED INJURY OF RIGHT ANKLE, INITIAL ENCOUNTER: ICD-10-CM

## 2023-09-08 PROCEDURE — 99214 OFFICE O/P EST MOD 30 MIN: CPT

## 2023-09-08 NOTE — ADDENDUM
[FreeTextEntry1] : I, WAGNER PIMENTEL, acted solely as a scribe for Dr. Popeye Smallwood on this date 09/08/2023  .   All medical record entries made by the Scribe were at my, Dr. Popeye Smallwood, direction and personally dictated by me on 09/08/2023 . I have reviewed the chart and agree that the record accurately reflects my personal performance of the history, physical exam, assessment and plan. I have also personally directed, reviewed, and agreed with the chart.

## 2023-09-08 NOTE — HISTORY OF PRESENT ILLNESS
[FreeTextEntry1] : 9/8/23: ANUSHA GRAFF is a 22 year old male who presents for initial evaluation of the right ankle. He reports that he twisted his right ankle when playing pickleball. He was evaluated at the ER room and had x-rays of the ankle. He states that his pain has improved since the time of the injury. He presents today wearing sneakers and is ambulating without assistance.   7/24/23: ANUSHA GRAFF is a 22 year old male who presents for follow up evaluation of his left ankle. He states that he recently has not gone to physical therapy. He states that his pain in his left ankle has remained the same with no improvement. He does complain of right ankle pain that started 2 weeks ago after playing basketball. His pain is worse on his right ankle than his left. HIs right ankle pain is localized to the lateral and medial aspect of the ankle. He is wearing sneakers and is ambulating without assistance  05/15/23: ANUSHA GRAFF is a 22 year old male who presents for follow up evaluation of his left ankle. He presents today for MRI review. He presents today wearing sneakers and is ambulating without assistance. He notes the he had a previous Cortizone injection administered by his DPM.  4/12/23: The patient is here for follow-up of his left ankle. He finished physical therapy and had relief, but he is still having some intermittent pain. He would like an MRI to see how his ankle is progressing and what next steps he can take.   1/18/23: The patient is here for follow-up of his left ankle injury.  He has been performing physical therapy which has helped him.  He just finished physical therapy.  His pain is much improved left ankle.  He does have pain he states is mostly on the outside portion of his ankle.  No other complaints.  10/24/22: The patient is a 22-year-old male who presents with a left ankle injury which she sustained at the end of April while playing basketball.  He came down on someone's foot and rolled the left ankle.  He does have an MRI of the left ankle to be reviewed in office today.  He has not done any type of outpatient therapy for his left ankle.  He denies locking and catching of the ankle.  He does have a brace given to him by a podiatrist which she does use.  No other complaints.  Pain scale minimal today in the office. No other complaints.

## 2023-09-08 NOTE — DISCUSSION/SUMMARY
[de-identified] : Today I had a lengthy discussion with the patient regarding their right ankle pain. I have addressed all the patient's concerns surrounding the pathology of their condition. I recommend the patient undergo a course of physical therapy for the right ankle 2-3 times a week for a total of 6-8 weeks. A prescription was given for the physical therapy today. I recommend that the patient utilize ice, NSAIDS PRN, and heat. They can also elevate their Left ankle above the level of the heart. A referral for acupuncture was provided for the patient today in office.   I recommend the patient follow up in 6-8 weeks to reassess their condition.   The patient understood and verbally agreed to the treatment plan. All of their questions were answered and they were satisfied with the visit. The patient should call the office if they have any questions or experience worsening symptoms.

## 2023-09-08 NOTE — PHYSICAL EXAM
[de-identified] : Left ankle Physical Examination:\par  \par  General: Alert and oriented x3.  In no acute distress.  Pleasant in nature with a normal affect.  No apparent respiratory distress. \par  Erythema, Warmth, Rubor: Negative\par  Swelling: Negative\par  \par  ROM:\par  1. Dorsiflexion: 10 degrees\par  2. Plantarflexion: 40 degrees\par  3. Inversion: 30 degrees\par  4. Eversion: 30 degrees\par  \par  Tenderness to Palpation: \par  1. Lateral Malleolus: Negative\par  2. Medial Malleolus: Negative\par  3. Proximal Fibular Pain: Negative\par  4. Heel Pain: Negative\par  5. Cuboid: Negative\par  6. Navicular: Negative\par  7. Tibiotalar Joint: Negative\par  8. Subtalar Joint: Negative\par  9. Posterior Recess: Negative\par  \par  Tendon Pain:\par  1. Achilles: Negative\par  2. Peroneals: Positive, improved.\par  3. Posterior Tibialis: Negative\par  4. Tibialis Anterior: Negative\par  \par  Ligament Pain:\par  1. ATFL: Positive, improved.\par  2. CFL: Negative \par  3. PTFL: Negative\par  4. Deltoid Ligaments: Negative\par  5. Lis Franc Ligament: Negative\par  \par  Stability: \par  1. Anterior Drawer: Negative\par  2. Posterior Drawer: Negative\par  \par  Strength: 5/5 TA/GS/EHL\par  \par  Pulses: 2+ DP/PT Pulses\par  \par  Neuro: Intact motor and sensory\par  \par  Additional Test:\par  1. Calcaneal Squeeze Test: Negative\par  2. Syndesmosis Squeeze Test: Negative [de-identified] : no new imaging

## 2023-09-15 RX ORDER — MELOXICAM 15 MG/1
15 TABLET ORAL
Qty: 30 | Refills: 2 | Status: ACTIVE | COMMUNITY
Start: 2023-05-15 | End: 1900-01-01

## 2025-01-07 NOTE — ED ADULT TRIAGE NOTE - HEIGHT IN INCHES
67
REVIEW OF SYSTEMS:    CONSTITUTIONAL:  No weakness, fevers or chills  EYES/ENT:  No visual changes;  No vertigo or throat pain   NECK:  No pain or stiffness  RESPIRATORY:  No cough, wheezing, hemoptysis; No shortness of breath  CARDIOVASCULAR:  No chest pain or palpitations  GASTROINTESTINAL:  +Epigastric pain, nausea, vomiting, No hematemesis; No diarrhea or constipation. No melena or hematochezia.  GENITOURINARY:  No dysuria, frequency or hematuria  NEUROLOGICAL:  No numbness or weakness  SKIN:  No itching, rashes

## 2025-05-19 NOTE — ED ADULT TRIAGE NOTE - NS ED TRIAGE AVPU SCALE
Pt. Prompted for urine.   Alert-The patient is alert, awake and responds to voice. The patient is oriented to time, place, and person. The triage nurse is able to obtain subjective information.

## 2025-08-29 ENCOUNTER — OFFICE (OUTPATIENT)
Dept: URBAN - METROPOLITAN AREA CLINIC 115 | Facility: CLINIC | Age: 25
Setting detail: OPHTHALMOLOGY
End: 2025-08-29
Payer: COMMERCIAL

## 2025-08-29 DIAGNOSIS — H43.391: ICD-10-CM

## 2025-08-29 DIAGNOSIS — H01.004: ICD-10-CM

## 2025-08-29 DIAGNOSIS — H01.001: ICD-10-CM

## 2025-08-29 DIAGNOSIS — H01.005: ICD-10-CM

## 2025-08-29 DIAGNOSIS — H16.222: ICD-10-CM

## 2025-08-29 DIAGNOSIS — H16.221: ICD-10-CM

## 2025-08-29 DIAGNOSIS — H01.002: ICD-10-CM

## 2025-08-29 PROBLEM — H16.223 DRY EYE SYNDROME K SICCA; BOTH EYES: Status: ACTIVE | Noted: 2025-08-29

## 2025-08-29 PROCEDURE — 83861 MICROFLUID ANALY TEARS: CPT | Mod: RT | Performed by: OPHTHALMOLOGY

## 2025-08-29 PROCEDURE — 83861 MICROFLUID ANALY TEARS: CPT | Mod: LT | Performed by: OPHTHALMOLOGY

## 2025-08-29 PROCEDURE — 92004 COMPRE OPH EXAM NEW PT 1/>: CPT | Performed by: OPHTHALMOLOGY

## 2025-08-29 PROCEDURE — 92250 FUNDUS PHOTOGRAPHY W/I&R: CPT | Performed by: OPHTHALMOLOGY

## 2025-08-29 ASSESSMENT — KERATOMETRY
OS_AXISANGLE_DEGREES: 085
OD_K1POWER_DIOPTERS: 41.25
OD_AXISANGLE_DEGREES: 075
OS_K1POWER_DIOPTERS: 41.25
OS_K2POWER_DIOPTERS: 42.00
OD_K2POWER_DIOPTERS: 41.75

## 2025-08-29 ASSESSMENT — REFRACTION_CURRENTRX
OS_AXIS: 180
OD_CYLINDER: -1.00
OS_SPHERE: -3.00
OD_AXIS: 170
OD_SPHERE: -3.00
OS_OVR_VA: 20/
OS_AXIS: 180
OD_OVR_VA: 20/
OD_OVR_VA: 20/
OS_CYLINDER: -0.75
OS_OVR_VA: 20/
OD_SPHERE: -4.00
OD_CYLINDER: SPH
OS_SPHERE: -4.00
OS_CYLINDER: -0.50

## 2025-08-29 ASSESSMENT — VISUAL ACUITY
OS_BCVA: 20/20-1
OD_BCVA: 20/20

## 2025-08-29 ASSESSMENT — REFRACTION_MANIFEST
OS_CYLINDER: -1.00
OS_AXIS: 180
OD_SPHERE: -3.75
OS_VA1: 20/20
OD_VA1: 20/20
OD_CYLINDER: -0.50
OD_AXIS: 160
OS_SPHERE: -3.50

## 2025-08-29 ASSESSMENT — TONOMETRY
OS_IOP_MMHG: 16
OD_IOP_MMHG: 11

## 2025-08-29 ASSESSMENT — CONFRONTATIONAL VISUAL FIELD TEST (CVF)
OS_FINDINGS: FULL
OD_FINDINGS: FULL

## 2025-08-29 ASSESSMENT — SUPERFICIAL PUNCTATE KERATITIS (SPK)
OD_SPK: T
OS_SPK: T

## 2025-08-29 ASSESSMENT — LID EXAM ASSESSMENTS
OS_BLEPHARITIS: LLL LUL 1+
OD_BLEPHARITIS: RLL RUL 1+

## 2025-08-29 ASSESSMENT — REFRACTION_AUTOREFRACTION
OD_CYLINDER: -0.75
OS_SPHERE: -3.75
OS_AXIS: 178
OD_SPHERE: -4.00
OS_CYLINDER: -0.75
OD_AXIS: 168